# Patient Record
Sex: FEMALE
[De-identification: names, ages, dates, MRNs, and addresses within clinical notes are randomized per-mention and may not be internally consistent; named-entity substitution may affect disease eponyms.]

---

## 2018-03-20 ENCOUNTER — HOSPITAL ENCOUNTER (EMERGENCY)
Dept: HOSPITAL 31 - C.ER | Age: 53
Discharge: HOME | End: 2018-03-20
Payer: COMMERCIAL

## 2018-03-20 VITALS — HEART RATE: 58 BPM | OXYGEN SATURATION: 96 %

## 2018-03-20 VITALS — DIASTOLIC BLOOD PRESSURE: 70 MMHG | SYSTOLIC BLOOD PRESSURE: 130 MMHG | RESPIRATION RATE: 20 BRPM | TEMPERATURE: 98 F

## 2018-03-20 DIAGNOSIS — J32.9: Primary | ICD-10-CM

## 2018-03-20 NOTE — C.PDOC
History Of Present Illness


52 year old female with no significant PMH presents to ED with complaints of 3 

day history of headache and cough with associated chest discomfort. She 

describes pain as pressure like to frontal head area, feels fullness to left 

ear and has yellow nasal mucus. She reports having left eye redness itching, 

tearing and irritation. She has been taking Advil and Claritin with minimal 

relief. She also reports throat discomfort, scratchy sensation which goes into 

her chest and non-productive cough. Denies any fever, dizziness, nausea, or 

vomiting. 


Time Seen by Provider: 18 11:59


Chief Complaint (Nursing): Headache


History Per: Patient


History/Exam Limitations: no limitations


Onset/Duration Of Symptoms: Days (3)


Current Symptoms Are (Timing): Still Present


Quality: Aching, Pressure


Additional History Per: Patient





Past Medical History


Reviewed: Historical Data, Nursing Documentation, Vital Signs


Vital Signs: 


 Last Vital Signs











Temp  98.0 F   18 14:44


 


Pulse  58 L  18 14:44


 


Resp  20   18 14:44


 


BP  130/70   18 14:44


 


Pulse Ox  96   18 16:37














- Medical History


PMH: Bronchitis (4 YEARS AGO)


Surgical History: Cholecystectomy, Endoscopy, Tonsillectomy


Family History: States: Unknown Family Hx





- Social History


Hx Tobacco Use: No


Hx Alcohol Use: No


Hx Substance Use: No





- Immunization History


Hx Tetanus Toxoid Vaccination: No (unsure)


Hx Influenza Vaccination: No


Hx Pneumococcal Vaccination: Yes





Review Of Systems


Constitutional: Negative for: Fever, Chills


Eyes: Positive for: Redness (left eye )


ENT: Positive for: Ear Pain (left ), Other (throat discomfort )


Cardiovascular: Positive for: Other (chest discomfort )


Respiratory: Positive for: Cough.  Negative for: Sputum


Gastrointestinal: Negative for: Nausea, Vomiting


Neurological: Positive for: Headache.  Negative for: Dizziness





Physical Exam





- Physical Exam


Appears: Non-toxic, No Acute Distress


Skin: Normal Color, Warm, Dry


Head: Atraumatic, Tenderness (left maxillary sinus )


Eye(s): bilateral: Normal Inspection, Other (no conjunctival injection, 

discharge, foreign body, or eyelid edema  )


Ear(s): Bilateral: TM Dull


Oral Mucosa: Moist


Neck: Normal ROM, Supple


Chest: Symmetrical, No Deformity, No Tenderness


Cardiovascular: Rhythm Regular, No Murmur


Respiratory: Normal Breath Sounds, No Rales, No Rhonchi, No Wheezing


Extremity: Normal ROM, Capillary Refill (less than 2 seconds )


Neurological/Psych: Oriented x3, Normal Speech, Normal Cognition


Gait: Steady





ED Course And Treatment


ECG: Interpreted By Me, Viewed By Me


ECG Rhythm: Sinus Rhythm


Interpretation Of ECG: EKG NS at 60 bpm with normal axis and sinus arrhythmia


Rate From EC


O2 Sat by Pulse Oximetry: 96 (on RA )


Pulse Ox Interpretation: Normal





- Other Rad


  ** CXR


X-Ray: Interpreted by Me, Viewed By Me, Read By Radiologist


Interpretation: PROCEDURE:  CT HEAD WITHOUT CONTRAST.  HISTORY:  Headache, 

sinus pain, left eye pain.  COMPARISON:  None available.  TECHNIQUE:  Axial 

computed tomography images were obtained through the head/brain without 

intravenous contrast.  Radiation dose:  Total exam DLP = 866.08 mGy-cm.  This 

CT exam was performed using one or more of the following dose reduction 

techniques: Automated exposure control, adjustment of the mA and/or kV 

according to patient size, and/or use of iterative reconstruction technique.  

FINDINGS:  HEMORRHAGE:  No intracranial hemorrhage.  BRAIN:  Gray-white matter 

differentiation is preserved.  There is no mass, mass effect or abnormal extra-

axial fluid collection.  VENTRICLES:  The ventricles are normal in size, shape 

and configuration.  CALVARIUM:  The skull base and calvarium are normal.  

PARANASAL SINUSES:  There is fluid in the left maxillary sinus. The remaining 

included paranasal sinuses are predominantly clear.  MASTOID AIR CELLS:  

Predominantly clear.  OTHER FINDINGS:  None.  IMPRESSION:  No acute 

intracranial abnormality.  Fluid in the left maxillary sinus may represent 

acute sinusitis in the appropriate clinical setting. Clinical correlation and 

follow-up is advised.





- CT Scan/US


  ** CT Head


Other Rad Studies (CT/US): Interpreted By Me, Read By Radiologist, Radiology 

Report Reviewed


CT/US Interpretation: PROCEDURE:  CT HEAD WITHOUT CONTRAST.  HISTORY:  Headache

, sinus pain, left eye pain.  COMPARISON:  None available.  TECHNIQUE:  Axial 

computed tomography images were obtained through the head/brain without 

intravenous contrast.  Radiation dose:  Total exam DLP = 866.08 mGy-cm.  This 

CT exam was performed using one or more of the following dose reduction 

techniques: Automated exposure control, adjustment of the mA and/or kV 

according to patient size, and/or use of iterative reconstruction technique.  

FINDINGS:  HEMORRHAGE:  No intracranial hemorrhage.  BRAIN:  Gray-white matter 

differentiation is preserved.  There is no mass, mass effect or abnormal extra-

axial fluid collection.  VENTRICLES:  The ventricles are normal in size, shape 

and configuration.  CALVARIUM:  The skull base and calvarium are normal.  

PARANASAL SINUSES:  There is fluid in the left maxillary sinus. The remaining 

included paranasal sinuses are predominantly clear.  MASTOID AIR CELLS:  

Predominantly clear.  OTHER FINDINGS:  None.  IMPRESSION:  No acute 

intracranial abnormality.  Fluid in the left maxillary sinus may represent 

acute sinusitis in the appropriate clinical setting. Clinical correlation and 

follow-up is advised.





Medical Decision Making


Medical Decision Making: 





Patient with complaints of headache and multiple other symptoms. Appears well 

nontoxic and in no acute distress. EKG obtained during triage with no acute 

ischemic changse. Plan is to order CT head and CXR. Treat with Sudafed PO. CXR 

shows no active disease. CT reviewed showing Fluid in the left maxillary sinus 

which may represent acute sinusitis. In this clinical setting acute sinusitis 

is appropriate diagnosis and will treat. Patient re-evaluated and remains 

afebrile alert and oriented. I explained results to patient. She feels 

comfortable going home and will be discharged with Rx. Patient advised to 

follow up in the clinic. 





Disposition


Counseled Patient/Family Regarding: Studies Performed, Need For Followup, Rx 

Given





- Disposition


Referrals: 


Saba Sofia MD [Staff Provider] - 


Disposition: HOME/ ROUTINE


Disposition Time: 14:30


Condition: GOOD


Additional Instructions: 


Take antibiotic as indicated for sinus infection





Take daily allergy medicine, can also try nasal spray such as Flonase





Take Sudafed or Mucinex as decongestant 





follow up with the clinic for further evaluation


Prescriptions: 


Doxycycline Hyclate 100 mg PO BID #20 capsule


Instructions:  Sinusitis, Adult (DC)


Forms:  [employer] (Setswana)


Print Language: Urdu





- POA


Present On Arrival: None





- Clinical Impression


Clinical Impression: 


 Sinusitis








- PA / NP / Resident Statement


MD/DO has reviewed & agrees with the documentation as recorded.





- Scribe Statement


The provider has reviewed the documentation as recorded by the Scribe (Delores Cortez)








All medical record entries made by the Scribe were at my direction and 

personally dictated by me. I have reviewed the chart and agree that the record 

accurately reflects my personal performance of the history, physical exam, 

medical decision making, and the department course for this patient. I have 

also personally directed, reviewed, and agree with the discharge instructions 

and disposition.

## 2018-03-20 NOTE — CT
PROCEDURE:  CT HEAD WITHOUT CONTRAST.



HISTORY:

Headache, sinus pain, left eye pain



COMPARISON:

None available. 



TECHNIQUE:

Axial computed tomography images were obtained through the head/brain 

without intravenous contrast.  



Radiation dose:



Total exam DLP = 866.08 mGy-cm.



This CT exam was performed using one or more of the following dose 

reduction techniques: Automated exposure control, adjustment of the 

mA and/or kV according to patient size, and/or use of iterative 

reconstruction technique.



FINDINGS:



HEMORRHAGE:

No intracranial hemorrhage. 



BRAIN:

Gray-white matter differentiation is preserved.  There is no mass, 

mass effect or abnormal extra-axial fluid collection.



VENTRICLES:

The ventricles are normal in size, shape and configuration.



CALVARIUM:

The skull base and calvarium are normal.



PARANASAL SINUSES:

There is fluid in the left maxillary sinus. The remaining included 

paranasal sinuses are predominantly clear.



MASTOID AIR CELLS:

Predominantly clear.



OTHER FINDINGS:

None.



IMPRESSION:

No acute intracranial abnormality.



Fluid in the left maxillary sinus may represent acute sinusitis in 

the appropriate clinical setting. Clinical correlation and follow-up 

is advised.

## 2018-03-21 NOTE — CARD
--------------- APPROVED REPORT --------------





EKG Measurement

Heart [id number]

CT [id number]

[id number]

[id number]

[id number]



<Conclusion>

Normal sinus rhythm with sinus arrhythmia

Normal ECG

## 2018-11-07 ENCOUNTER — HOSPITAL ENCOUNTER (OUTPATIENT)
Dept: HOSPITAL 31 - C.ER | Age: 53
Setting detail: OBSERVATION
LOS: 1 days | Discharge: HOME | End: 2018-11-08
Attending: INTERNAL MEDICINE | Admitting: INTERNAL MEDICINE
Payer: COMMERCIAL

## 2018-11-07 VITALS — RESPIRATION RATE: 20 BRPM

## 2018-11-07 DIAGNOSIS — K21.9: ICD-10-CM

## 2018-11-07 DIAGNOSIS — E78.5: ICD-10-CM

## 2018-11-07 DIAGNOSIS — E78.00: ICD-10-CM

## 2018-11-07 DIAGNOSIS — Z82.49: ICD-10-CM

## 2018-11-07 DIAGNOSIS — E50.7: ICD-10-CM

## 2018-11-07 DIAGNOSIS — E78.1: ICD-10-CM

## 2018-11-07 DIAGNOSIS — Z23: ICD-10-CM

## 2018-11-07 DIAGNOSIS — Z90.49: ICD-10-CM

## 2018-11-07 DIAGNOSIS — R07.9: Primary | ICD-10-CM

## 2018-11-07 DIAGNOSIS — Z79.82: ICD-10-CM

## 2018-11-07 LAB
ALBUMIN SERPL-MCNC: 4.5 G/DL (ref 3.5–5)
ALBUMIN/GLOB SERPL: 1.2 {RATIO} (ref 1–2.1)
ALT SERPL-CCNC: 22 U/L (ref 9–52)
APTT BLD: 34 SECONDS (ref 21–34)
AST SERPL-CCNC: 26 U/L (ref 14–36)
BACTERIA #/AREA URNS HPF: (no result) /[HPF]
BASOPHILS # BLD AUTO: 0.1 K/UL (ref 0–0.2)
BASOPHILS NFR BLD: 0.6 % (ref 0–2)
BILIRUB UR-MCNC: NEGATIVE MG/DL
BNP SERPL-MCNC: 123 PG/ML (ref 0–900)
BUN SERPL-MCNC: 10 MG/DL (ref 7–17)
CALCIUM SERPL-MCNC: 9.8 MG/DL (ref 8.6–10.4)
EOSINOPHIL # BLD AUTO: 0.3 K/UL (ref 0–0.7)
EOSINOPHIL NFR BLD: 3.1 % (ref 0–4)
ERYTHROCYTE [DISTWIDTH] IN BLOOD BY AUTOMATED COUNT: 13.6 % (ref 11.5–14.5)
GFR NON-AFRICAN AMERICAN: > 60
GLUCOSE UR STRIP-MCNC: NORMAL MG/DL
HCG,QUALITATIVE URINE: NEGATIVE
HGB BLD-MCNC: 13.8 G/DL (ref 11–16)
INR PPP: 1
LEUKOCYTE ESTERASE UR-ACNC: (no result) LEU/UL
LYMPHOCYTES # BLD AUTO: 4.2 K/UL (ref 1–4.3)
LYMPHOCYTES NFR BLD AUTO: 40.8 % (ref 20–40)
MCH RBC QN AUTO: 28.2 PG (ref 27–31)
MCHC RBC AUTO-ENTMCNC: 34 G/DL (ref 33–37)
MCV RBC AUTO: 83 FL (ref 81–99)
MONOCYTES # BLD: 0.6 K/UL (ref 0–0.8)
MONOCYTES NFR BLD: 5.8 % (ref 0–10)
NEUTROPHILS # BLD: 5.1 K/UL (ref 1.8–7)
NEUTROPHILS NFR BLD AUTO: 49.7 % (ref 50–75)
NRBC BLD AUTO-RTO: 0 % (ref 0–2)
PH UR STRIP: 6 [PH] (ref 5–8)
PLATELET # BLD: 409 K/UL (ref 130–400)
PMV BLD AUTO: 8 FL (ref 7.2–11.7)
PROT UR STRIP-MCNC: NEGATIVE MG/DL
PROTHROMBIN TIME: 11.4 SECONDS (ref 9.7–12.2)
RBC # BLD AUTO: 4.91 MIL/UL (ref 3.8–5.2)
RBC # UR STRIP: NEGATIVE /UL
SP GR UR STRIP: 1 (ref 1–1.03)
UROBILINOGEN UR-MCNC: NORMAL MG/DL (ref 0.2–1)
WBC # BLD AUTO: 10.2 K/UL (ref 4.8–10.8)

## 2018-11-07 PROCEDURE — 81001 URINALYSIS AUTO W/SCOPE: CPT

## 2018-11-07 PROCEDURE — 90674 CCIIV4 VAC NO PRSV 0.5 ML IM: CPT

## 2018-11-07 PROCEDURE — 90732 PPSV23 VACC 2 YRS+ SUBQ/IM: CPT

## 2018-11-07 PROCEDURE — 86663 EPSTEIN-BARR ANTIBODY: CPT

## 2018-11-07 PROCEDURE — 86658 ENTEROVIRUS ANTIBODY: CPT

## 2018-11-07 PROCEDURE — 36415 COLL VENOUS BLD VENIPUNCTURE: CPT

## 2018-11-07 PROCEDURE — 71045 X-RAY EXAM CHEST 1 VIEW: CPT

## 2018-11-07 PROCEDURE — 80061 LIPID PANEL: CPT

## 2018-11-07 PROCEDURE — 93306 TTE W/DOPPLER COMPLETE: CPT

## 2018-11-07 PROCEDURE — 84703 CHORIONIC GONADOTROPIN ASSAY: CPT

## 2018-11-07 PROCEDURE — 85610 PROTHROMBIN TIME: CPT

## 2018-11-07 PROCEDURE — 86140 C-REACTIVE PROTEIN: CPT

## 2018-11-07 PROCEDURE — 84443 ASSAY THYROID STIM HORMONE: CPT

## 2018-11-07 PROCEDURE — 83036 HEMOGLOBIN GLYCOSYLATED A1C: CPT

## 2018-11-07 PROCEDURE — 87807 RSV ASSAY W/OPTIC: CPT

## 2018-11-07 PROCEDURE — 83735 ASSAY OF MAGNESIUM: CPT

## 2018-11-07 PROCEDURE — 90471 IMMUNIZATION ADMIN: CPT

## 2018-11-07 PROCEDURE — 85730 THROMBOPLASTIN TIME PARTIAL: CPT

## 2018-11-07 PROCEDURE — 93005 ELECTROCARDIOGRAM TRACING: CPT

## 2018-11-07 PROCEDURE — 84100 ASSAY OF PHOSPHORUS: CPT

## 2018-11-07 PROCEDURE — 84484 ASSAY OF TROPONIN QUANT: CPT

## 2018-11-07 PROCEDURE — 99285 EMERGENCY DEPT VISIT HI MDM: CPT

## 2018-11-07 PROCEDURE — 83880 ASSAY OF NATRIURETIC PEPTIDE: CPT

## 2018-11-07 PROCEDURE — 85025 COMPLETE CBC W/AUTO DIFF WBC: CPT

## 2018-11-07 PROCEDURE — 85651 RBC SED RATE NONAUTOMATED: CPT

## 2018-11-07 PROCEDURE — 80053 COMPREHEN METABOLIC PANEL: CPT

## 2018-11-07 PROCEDURE — 96372 THER/PROPH/DIAG INJ SC/IM: CPT

## 2018-11-07 NOTE — RAD
Date of service: 



11/07/2018



PROCEDURE:  CHEST RADIOGRAPH, 1 VIEW



HISTORY:

chest pain



COMPARISON:

3/20/2018



FINDINGS:



LUNGS:

Clear.



PLEURA:

No pneumothorax or pleural fluid seen.



CARDIOVASCULAR:

Normal.



OSSEOUS STRUCTURES:

No significant abnormalities.



VISUALIZED UPPER ABDOMEN:

Normal.



OTHER FINDINGS:

None. 



IMPRESSION:

No active disease.

## 2018-11-07 NOTE — CP.PCM.HP
<Mena Booker P - Last Filed: 11/08/18 10:55>





History of Present Illness





- History of Present Illness


History of Present Illness: 


H&P for hospitalist service.





HPI: 52 year old female with PMHx of impaired glucose tolerance, and 

hypercholesterolemia presents to the ED complaining of 7/10 left sided chest 

pain for the past 3-4 days. Pain is described as pressure and worsens with 

bending over and with exertion. States this has never happened before. Patient 

also complains of shortness of breath, diaphoresis, neck pain, frontal headache,

dizziness, slow heart rate, sensation of inflammation to the ears and buzzing in

the ears. Patient denies fever, chills, nausea, vomiting, abdominal pain, and 

cough.





PMHx: impaired glucose tolerance, hypercholesterolemia, retinal degeneration, 

dry eyes


PSHx: cholecystectomy, tonsillectomy, hemmorroid, b/l foot surgery


Meds: Zocor 20mg HS, Refresh eye drops


Allergies: PCN, unknown reaction


Family: Mother-MI at 68 years old, Father-HTN, glaucoma


Social: denies tobacco, alcohol, drugs. Works as a . Lives with her 

12 year old son.


Code status: full


Proxy: Flori Singh, friend, [phone]








Present on Admission





- Present on Admission


Any Indicators Present on Admission: No





Review of Systems





- Constitutional


Constitutional: Headache.  absent: Chills, Fever





- EENT


Eyes: absent: Discharge, Pain


Ears: Dizziness, Other (wax)


Nose/Mouth/Throat: Neck Pain.  absent: Dysphagia, Sore Throat





- Cardiovascular


Cardiovascular: Chest Pain, Diaphoresis, Slow Heart Rate.  absent: Edema, 

Palpitations





- Respiratory


Respiratory: Dyspnea.  absent: Cough, Hemoptysis





- Gastrointestinal


Gastrointestinal: absent: Abdominal Pain, Constipation, Diarrhea, Dysphagia, 

Heartburn, Nausea, Vomiting





- Genitourinary


Genitourinary: absent: Difficulty Urinating, Dysuria, Flank Pain, Hematuria





- Musculoskeletal


Musculoskeletal: Neck Pain, Other (chest pain).  absent: Joint Swelling





- Integumentary


Integumentary: absent: Rash, Swelling, Unusual Bruising, Wounds





- Neurological


Neurological: Dizziness, Headaches.  absent: Burning Sensations, Convulsions, 

Disequilibrium, Focal Weakness, Paresthesias





Past Patient History





- Infectious Disease


Hx of Infectious Diseases: None





- Past Medical History & Family History


Past Medical History?: Yes





- Past Social History


Smoking Status: Never Smoked





- PULMONARY


Hx Bronchitis: Yes (4 YEARS AGO)





- ENDOCRINE/METABOLIC


Other/Comment: "pre diabetic"





- MUSCULOSKELETAL/RHEUMATOLOGICAL


Hx Musculoskeletal Disorders: Yes (BUNION/HAMMERTOES RIGHT FOOT)





- GASTROINTESTINAL


Hx Gastrointestinal Disorders: Yes


Hx Gastroesophageal Reflux: Yes





- PSYCHIATRIC


Hx Substance Use: No





- SURGICAL HISTORY


Hx Cholecystectomy: Yes


Hx Tonsillectomy: Yes





- ANESTHESIA


Hx Anesthesia: Yes


Hx Anesthesia Reactions: Yes (NAUSEA)


Hx Malignant Hyperthermia: No





Meds


Allergies/Adverse Reactions: 


                                    Allergies











Allergy/AdvReac Type Severity Reaction Status Date / Time


 


Penicillins Allergy Intermediate RASH Verified 03/20/18 11:24














Physical Exam





- Constitutional


Appears: Non-toxic, No Acute Distress





- Head Exam


Head Exam: ATRAUMATIC, NORMOCEPHALIC





- Eye Exam


Eye Exam: EOMI, Normal appearance, PERRL





- ENT Exam


ENT Exam: Mucous Membranes Moist, TM's Normal Bilaterally





- Neck Exam


Neck exam: Positive for: Full Rom, Normal Inspection, Tenderness (to palpation 

of paracervical muscles)





- Respiratory Exam


Respiratory Exam: Clear to Auscultation Bilateral.  absent: Rales, Rhonchi, 

Wheezes





- Cardiovascular Exam


Cardiovascular Exam: Bradycardia, +S1, +S2.  absent: Systolic Murmur


Additional comments: 


Reproducible chest pain with palpation. 








- GI/Abdominal Exam


GI & Abdominal Exam: Guarding, Normal Bowel Sounds, Soft.  absent: Rebound, 

Rigid, Tenderness





- Extremities Exam


Extremities exam: Positive for: full ROM, normal capillary refill, normal 

inspection, pedal pulses present.  Negative for: calf tenderness, pedal edema, 

tenderness





- Neurological Exam


Neurological exam: Alert, CN II-XII Intact, Oriented x3





- Psychiatric Exam


Psychiatric exam: Normal Affect, Normal Mood





- Skin


Skin Exam: Dry, Intact, Normal Color, Warm





Results





- Vital Signs


Recent Vital Signs: 





                                Last Vital Signs











Temp  98.1 F   11/07/18 20:20


 


Pulse  62   11/07/18 21:37


 


Resp  20   11/07/18 20:20


 


BP  135/80   11/07/18 20:20


 


Pulse Ox  97   11/07/18 20:20














- Labs


Result Diagrams: 


                                 11/08/18 07:39





                                 11/08/18 07:39


Labs: 





                         Laboratory Results - last 24 hr











  11/07/18 11/07/18 11/07/18





  18:23 18:23 18:23


 


WBC  10.2  


 


RBC  4.91  


 


Hgb  13.8  


 


Hct  40.7  


 


MCV  83.0  


 


MCH  28.2  


 


MCHC  34.0  


 


RDW  13.6  


 


Plt Count  409 H  


 


MPV  8.0  


 


Neut % (Auto)  49.7 L  


 


Lymph % (Auto)  40.8 H  


 


Mono % (Auto)  5.8  


 


Eos % (Auto)  3.1  


 


Baso % (Auto)  0.6  


 


Neut # (Auto)  5.1  


 


Lymph # (Auto)  4.2  


 


Mono # (Auto)  0.6  


 


Eos # (Auto)  0.3  


 


Baso # (Auto)  0.1  


 


PT   11.4 


 


INR   1.0 


 


APTT   34 


 


Sodium    141


 


Potassium    3.7


 


Chloride    105


 


Carbon Dioxide    27


 


Anion Gap    13


 


BUN    10


 


Creatinine    0.8


 


Est GFR ( Amer)    > 60


 


Est GFR (Non-Af Amer)    > 60


 


Random Glucose    98


 


Calcium    9.8


 


Total Bilirubin    0.6


 


AST    26


 


ALT    22


 


Alkaline Phosphatase    103


 


Troponin I    < 0.0120


 


NT-Pro-B Natriuret Pep    123


 


Total Protein    8.2


 


Albumin    4.5


 


Globulin    3.7


 


Albumin/Globulin Ratio    1.2


 


Urine Color   


 


Urine Clarity   


 


Urine pH   


 


Ur Specific Gravity   


 


Urine Protein   


 


Urine Glucose (UA)   


 


Urine Ketones   


 


Urine Blood   


 


Urine Nitrate   


 


Urine Bilirubin   


 


Urine Urobilinogen   


 


Ur Leukocyte Esterase   


 


Urine WBC (Auto)   


 


Urine Bacteria   


 


Urine HCG, Qual   














  11/07/18





  18:43


 


WBC 


 


RBC 


 


Hgb 


 


Hct 


 


MCV 


 


MCH 


 


MCHC 


 


RDW 


 


Plt Count 


 


MPV 


 


Neut % (Auto) 


 


Lymph % (Auto) 


 


Mono % (Auto) 


 


Eos % (Auto) 


 


Baso % (Auto) 


 


Neut # (Auto) 


 


Lymph # (Auto) 


 


Mono # (Auto) 


 


Eos # (Auto) 


 


Baso # (Auto) 


 


PT 


 


INR 


 


APTT 


 


Sodium 


 


Potassium 


 


Chloride 


 


Carbon Dioxide 


 


Anion Gap 


 


BUN 


 


Creatinine 


 


Est GFR ( Amer) 


 


Est GFR (Non-Af Amer) 


 


Random Glucose 


 


Calcium 


 


Total Bilirubin 


 


AST 


 


ALT 


 


Alkaline Phosphatase 


 


Troponin I 


 


NT-Pro-B Natriuret Pep 


 


Total Protein 


 


Albumin 


 


Globulin 


 


Albumin/Globulin Ratio 


 


Urine Color  Straw


 


Urine Clarity  Clear


 


Urine pH  6.0


 


Ur Specific Gravity  1.005


 


Urine Protein  Negative


 


Urine Glucose (UA)  Normal


 


Urine Ketones  Negative


 


Urine Blood  Negative


 


Urine Nitrate  Negative


 


Urine Bilirubin  Negative


 


Urine Urobilinogen  Normal


 


Ur Leukocyte Esterase  Trace


 


Urine WBC (Auto)  2


 


Urine Bacteria  Rare


 


Urine HCG, Qual  Negative














Assessment & Plan





- Assessment and Plan (Free Text)


Plan: 


52 year old female with PMHx of impaired glucose tolerance, HLD admitted for 

evaluation of chest pain. 





Chest pain rule out ACS


Possible symptomatic bradycardia


-EKG: Sinus bradycardia at 56bpm, f/u serial EKGs


-Troponin negx1, f/u serial ROMIs


-f/u lipid panel


-f/u hgb A1c


-f/u TSH


-Start Aspirin 81mg daily


-f/u echo


-Cardiology, Dr. SHIMON Cortez consulted. Help appreciated. 





Hyperlipidemia


-f/u lipid panel


-Crestor 2.5mg PO HS (substitute for home med of Zocor 20mg HS)





Impaired glucose tolerance


-f/u hgb A1c





Xerophthalmia


-Refresh eye drops (home med)





Prophylaxis


-SCD


-Heparin 5000 Q8H


-GI prophylaxis not indicated





Case discussed with attending physician, Dr. Yusuf. 








<Jesus Yusuf - Last Filed: 11/10/18 19:05>





Results





- Vital Signs


Recent Vital Signs: 





                                Last Vital Signs











Temp  98.6 F   11/08/18 15:09


 


Pulse  50 L  11/08/18 15:09


 


Resp  20   11/08/18 15:09


 


BP  102/63   11/08/18 15:09


 


Pulse Ox  96   11/08/18 15:09














- Labs


Result Diagrams: 


                                 11/08/18 07:39





                                 11/08/18 07:39





Assessment & Plan





- Date & Time


Date: 11/10/18 (I have seen and examined the patient.  I agree with the findings

and plan of care as documented by Dr. Booker.  Patient with chest pain.  

Bradycardia.  Monitor on tele.  Aspirin and Statin.  ROMIx3 with EKG.  Monitor 

for acute changes.)


Time: 19:04





Attending/Attestation





- Attestation


I have personally seen and examined this patient.: Yes


I have fully participated in the care of the patient.: Yes


I have reviewed all pertinent clinical information: Yes

## 2018-11-07 NOTE — C.PDOC
History Of Present Illness





52 year old female with a hx of hyperlipidemia and pre-diabetic presents to the 

ED for evaluation of chest pain and dizziness associated with light headedness 

that began 1 day ago. Denies fever, nausea, vomiting, syncope, and any other 

associated symptoms. 





Time Seen by Provider: 18 16:33


Chief Complaint (Nursing): Chest Pain


History Per: Patient


History/Exam Limitations: no limitations


Onset/Duration Of Symptoms: Days (1 day ago.)


Current Symptoms Are (Timing): Still Present





Past Medical History


Reviewed: Historical Data, Nursing Documentation, Vital Signs


Vital Signs: 





                                Last Vital Signs











Temp  97.7 F   18 15:56


 


Pulse  59 L  18 15:56


 


Resp  18   18 15:56


 


BP  133/68   18 15:56


 


Pulse Ox  100   18 15:56














- Medical History


PMH: Bronchitis (4 YEARS AGO)


Surgical History: Cholecystectomy, Endoscopy, Tonsillectomy


Family History: States: Unknown Family Hx





- Social History


Hx Tobacco Use: No


Hx Alcohol Use: No


Hx Substance Use: No





- Immunization History


Hx Tetanus Toxoid Vaccination: No (unsure)


Hx Influenza Vaccination: No


Hx Pneumococcal Vaccination: Yes





Review Of Systems


Constitutional: Negative for: Fever


Cardiovascular: Positive for: Chest Pain, Light Headedness


Gastrointestinal: Negative for: Nausea, Vomiting


Neurological: Positive for: Dizziness, Other (syncope.)





Physical Exam





- Physical Exam


Appears: Well, Non-toxic, No Acute Distress, Other (moderately obese.)


Skin: Normal Color, Warm, Dry


Head: Atraumatic, Normacephalic


Eye(s): bilateral: Normal Inspection


Oral Mucosa: Moist


Neck: Normal ROM


Chest: Symmetrical, No Deformity


Cardiovascular: Rhythm Regular, No Murmur


Respiratory: Normal Breath Sounds, No Rales, No Rhonchi, No Wheezing


Gastrointestinal/Abdominal: Normal Exam, Soft, No Tenderness


Neurological/Psych: Oriented x3, Normal Speech





ED Course And Treatment





- Laboratory Results


Result Diagrams: 


                                 18 07:39





                                 18 07:39


ECG Rhythm: Sinus Bradycardia


Rate From EC


O2 Sat by Pulse Oximetry: 100 (RA)


Pulse Ox Interpretation: Normal





- Other Rad


  ** CXR


X-Ray: Viewed By Me, Read By Radiologist


Interpretation: FINDINGS:  LUNGS:  Clear.  PLEURA:  No pneumothorax or pleural 

fluid seen.  CARDIOVASCULAR:  Normal.  OSSEOUS STRUCTURES:  No significant abno

rmalities.  VISUALIZED UPPER ABDOMEN:  Normal.  OTHER FINDINGS:  None.  

IMPRESSION:  No active disease.





Medical Decision Making


Medical Decision Making: 





Plan: 


-EKG 


-Blood sent. 


-CXR 


-Urine HCG 


-Urinalysis








labs cxr neg. pt with risk factors obs for cp. accepted hospitalist. 





Disposition





- Disposition


Disposition: HOSPITALIZED


Disposition Time: 17:00


Condition: STABLE





- Clinical Impression


Clinical Impression: 


 Chest pain








- Scribe Statement


The provider has reviewed the documentation as recorded by the Scribe (Yesenia Jonas)


Provider Attestation: 





All medical record entries made by the Scribe were at my direction and 

personally dictated by me. I have reviewed the chart and agree that the record 

accurately reflects my personal performance of the history, physical exam, 

medical decision making, and the department course for this patient. I have also

personally directed, reviewed, and agree with the discharge instructions and 

disposition.








Decision To Admit





- Pt Status Changed To:


Hospital Disposition Of: Observation





- .


Bed Request Type: Telemetry


Admitting Physician: Lenard Ventura


Patient Diagnosis: 


 Chest pain

## 2018-11-08 VITALS
TEMPERATURE: 98.6 F | SYSTOLIC BLOOD PRESSURE: 102 MMHG | OXYGEN SATURATION: 96 % | HEART RATE: 50 BPM | DIASTOLIC BLOOD PRESSURE: 63 MMHG

## 2018-11-08 LAB
ALBUMIN SERPL-MCNC: 4 G/DL (ref 3.5–5)
ALBUMIN/GLOB SERPL: 1.3 {RATIO} (ref 1–2.1)
ALT SERPL-CCNC: 21 U/L (ref 9–52)
AST SERPL-CCNC: 26 U/L (ref 14–36)
BASOPHILS # BLD AUTO: 0 K/UL (ref 0–0.2)
BASOPHILS NFR BLD: 0.6 % (ref 0–2)
BUN SERPL-MCNC: 10 MG/DL (ref 7–17)
CALCIUM SERPL-MCNC: 9.5 MG/DL (ref 8.6–10.4)
CK MB SERPL-MCNC: < 0.22 NG/ML (ref 0–3.38)
CK MB SERPL-MCNC: < 0.22 NG/ML (ref 0–3.38)
EOSINOPHIL # BLD AUTO: 0.3 K/UL (ref 0–0.7)
EOSINOPHIL NFR BLD: 4.2 % (ref 0–4)
ERYTHROCYTE [DISTWIDTH] IN BLOOD BY AUTOMATED COUNT: 13.6 % (ref 11.5–14.5)
ERYTHROCYTE [SEDIMENTATION RATE] IN BLOOD: 70 MM/HR (ref 0–20)
GFR NON-AFRICAN AMERICAN: > 60
HDLC SERPL-MCNC: 37 MG/DL (ref 30–70)
HGB BLD-MCNC: 12.9 G/DL (ref 11–16)
LDLC SERPL-MCNC: 133 MG/DL (ref 0–129)
LYMPHOCYTES # BLD AUTO: 3.4 K/UL (ref 1–4.3)
LYMPHOCYTES NFR BLD AUTO: 46.2 % (ref 20–40)
MCH RBC QN AUTO: 28.7 PG (ref 27–31)
MCHC RBC AUTO-ENTMCNC: 34.6 G/DL (ref 33–37)
MCV RBC AUTO: 82.9 FL (ref 81–99)
MONOCYTES # BLD: 0.4 K/UL (ref 0–0.8)
MONOCYTES NFR BLD: 5.9 % (ref 0–10)
NEUTROPHILS # BLD: 3.2 K/UL (ref 1.8–7)
NEUTROPHILS NFR BLD AUTO: 43.1 % (ref 50–75)
NRBC BLD AUTO-RTO: 0.1 % (ref 0–2)
PLATELET # BLD: 378 K/UL (ref 130–400)
PMV BLD AUTO: 8.4 FL (ref 7.2–11.7)
RBC # BLD AUTO: 4.48 MIL/UL (ref 3.8–5.2)
WBC # BLD AUTO: 7.4 K/UL (ref 4.8–10.8)

## 2018-11-08 NOTE — CARD
--------------- APPROVED REPORT --------------





Date of service: 11/07/2018



EKG Measurement

Heart [id number]

IL 146P12

[id number]

[id number]

[id number]



<Conclusion>

Sinus bradycardia

Otherwise normal ECG

## 2018-11-08 NOTE — CP.PCM.DIS
Provider





- Provider


Date of Admission: 


11/07/18 18:57





Attending physician: 


Lenard Ventura MD





Primary care physician: 


Dr. Sofia


Consults: 


Dr. Sanford


Time Spent in preparation of Discharge (in minutes): 35





Hospital Course





- Lab Results


Lab Results: 


                             Most Recent Lab Values











WBC  7.4 K/uL (4.8-10.8)   11/08/18  07:39    


 


RBC  4.48 Mil/uL (3.80-5.20)   11/08/18  07:39    


 


Hgb  12.9 g/dL (11.0-16.0)   11/08/18  07:39    


 


Hct  37.1 % (34.0-47.0)   11/08/18  07:39    


 


MCV  82.9 fL (81.0-99.0)   11/08/18  07:39    


 


MCH  28.7 pg (27.0-31.0)   11/08/18  07:39    


 


MCHC  34.6 g/dL (33.0-37.0)   11/08/18  07:39    


 


RDW  13.6 % (11.5-14.5)   11/08/18  07:39    


 


Plt Count  378 K/uL (130-400)   11/08/18  07:39    


 


MPV  8.4 fL (7.2-11.7)   11/08/18  07:39    


 


Neut % (Auto)  43.1 % (50.0-75.0)  L  11/08/18  07:39    


 


Lymph % (Auto)  46.2 % (20.0-40.0)  H  11/08/18  07:39    


 


Mono % (Auto)  5.9 % (0.0-10.0)   11/08/18  07:39    


 


Eos % (Auto)  4.2 % (0.0-4.0)  H  11/08/18  07:39    


 


Baso % (Auto)  0.6 % (0.0-2.0)   11/08/18  07:39    


 


Neut # (Auto)  3.2 K/uL (1.8-7.0)   11/08/18  07:39    


 


Lymph # (Auto)  3.4 K/uL (1.0-4.3)   11/08/18  07:39    


 


Mono # (Auto)  0.4 K/uL (0.0-0.8)   11/08/18  07:39    


 


Eos # (Auto)  0.3 K/uL (0.0-0.7)   11/08/18  07:39    


 


Baso # (Auto)  0.0 K/uL (0.0-0.2)   11/08/18  07:39    


 


ESR  70 mm/hr (0-20)  H  11/08/18  07:39    


 


PT  11.4 SECONDS (9.7-12.2)   11/07/18  18:23    


 


INR  1.0   11/07/18  18:23    


 


APTT  34 SECONDS (21-34)   11/07/18  18:23    


 


Sodium  140 mmol/L (132-148)   11/08/18  07:39    


 


Potassium  4.1 mmol/L (3.6-5.2)   11/08/18  07:39    


 


Chloride  107 mmol/L ()   11/08/18  07:39    


 


Carbon Dioxide  26 mmol/L (22-30)   11/08/18  07:39    


 


Anion Gap  11  (10-20)   11/08/18  07:39    


 


BUN  10 mg/dL (7-17)   11/08/18  07:39    


 


Creatinine  0.8 mg/dL (0.7-1.2)   11/08/18  07:39    


 


Est GFR ( Amer)  > 60   11/08/18  07:39    


 


Est GFR (Non-Af Amer)  > 60   11/08/18  07:39    


 


Random Glucose  97 mg/dL ()   11/08/18  07:39    


 


Hemoglobin A1c  5.8 % (4.2-6.5)   11/08/18  07:39    


 


Calcium  9.5 mg/dl (8.6-10.4)   11/08/18  07:39    


 


Phosphorus  3.9 mg/dL (2.5-4.5)   11/08/18  07:39    


 


Magnesium  2.3 mg/dL (1.6-2.3)   11/08/18  07:39    


 


Total Bilirubin  0.9 mg/dL (0.2-1.3)   11/08/18  07:39    


 


AST  26 U/L (14-36)   11/08/18  07:39    


 


ALT  21 U/L (9-52)   11/08/18  07:39    


 


Alkaline Phosphatase  114 U/L ()   11/08/18  07:39    


 


Total Creatine Kinase  < 20 U/L ()  L  11/08/18  07:39    


 


CK-MB (Mass)  < 0.22 ng/mL (0.0-3.38)   11/08/18  07:39    


 


Troponin I  < 0.0120 ng/mL (0.00-0.120)   11/08/18  07:39    


 


C-React Prot High Sens  4.58 mg/L (1.00-3.00)  H  11/08/18  13:57    


 


NT-Pro-B Natriuret Pep  123 pg/mL (0-900)   11/07/18  18:23    


 


Total Protein  7.2 g/dL (6.3-8.3)   11/08/18  07:39    


 


Albumin  4.0 g/dL (3.5-5.0)   11/08/18  07:39    


 


Globulin  3.1 gm/dL (2.2-3.9)   11/08/18  07:39    


 


Albumin/Globulin Ratio  1.3  (1.0-2.1)   11/08/18  07:39    


 


Triglycerides  209 mg/dL (0-149)  H  11/08/18  07:39    


 


Cholesterol  209 mg/dL (0-199)  H  11/08/18  07:39    


 


LDL Cholesterol Direct  133 mg/dL (0-129)  H  11/08/18  07:39    


 


HDL Cholesterol  37 mg/dL (30-70)   11/08/18  07:39    


 


TSH 3rd Generation  3.93 mIU/L (0.46-4.68)   11/08/18  07:39    


 


Urine Color  Straw  (YELLOW)   11/07/18  18:43    


 


Urine Clarity  Clear  (Clear)   11/07/18  18:43    


 


Urine pH  6.0  (5.0-8.0)   11/07/18  18:43    


 


Ur Specific Gravity  1.005  (1.003-1.030)   11/07/18  18:43    


 


Urine Protein  Negative mg/dL (NEGATIVE)   11/07/18  18:43    


 


Urine Glucose (UA)  Normal mg/dL (Normal)   11/07/18  18:43    


 


Urine Ketones  Negative mg/dL (NEGATIVE)   11/07/18  18:43    


 


Urine Blood  Negative  (NEGATIVE)   11/07/18  18:43    


 


Urine Nitrate  Negative  (NEGATIVE)   11/07/18  18:43    


 


Urine Bilirubin  Negative  (NEGATIVE)   11/07/18  18:43    


 


Urine Urobilinogen  Normal mg/dL (0.2-1.0)   11/07/18  18:43    


 


Ur Leukocyte Esterase  Trace Luisa/uL (Negative)   11/07/18  18:43    


 


Urine WBC (Auto)  2 /hpf (0-5)   11/07/18  18:43    


 


Urine Bacteria  Rare  (<OCC)   11/07/18  18:43    


 


Urine HCG, Qual  Negative  (NEGATIVE)   11/07/18  18:43    














- Hospital Course


Hospital Course: 





On admission: 


HPI: 52 year old female with PMHx of impaired glucose tolerance, and 

hypercholesterolemia presents to the ED complaining of 7/10 left sided chest 

pain for the past 3-4 days. Pain is described as pressure and worsens with 

bending over and with exertion. States this has never happened before. Patient 

also complains of shortness of breath, diaphoresis, neck pain, frontal headache,

dizziness, slow heart rate, sensation of inflammation to the ears and buzzing in

the ears. Patient denies fever, chills, nausea, vomiting, abdominal pain, and 

cough.





Hospital course: 


Patient was admitted for chest pain to evaluate for acute coronary syndrome. 

Cardiology Dr. Sanford was consulted who evaluated patient. Serial troponins were 

negative. Serial EKGs revealed sinus bradycardia. Preliminary ECHO read was 

normal  with no obvious deformities. Lipid panel revealed elevated triglycerides

at 209, elevated cholesterol at 209, LDL at 133, HDL 37, TSH 3.93. ProBNP was 

normal at 123. AST/ALT levels were within normal limits. Inflammatory markers 

ESR noted to be 70, CRP 4.58. Patient was treated with ASA and Crestor. Patient 

stated that her chest pain had resolved, and was only present on palpation. She 

admits to history of vertigo in the past for which she was treated with unknown 

medication. She states that her dizziness and buzzing in her ears is worse when 

she bends over. Upon discharge, patient states she no longer has any chest pain 

and feels comfortable going home. 





Discharge summary: 


Follow up with [facility] clinic in 2-3 weeks for further follow up. 

You have elevated levels of ESR and CRP. Please follow up in the clinic for 

referral to Rheumatology. Continue your home medications Zocor 20mg by mouth at 

night for your elevated cholesterol levels. Please return to the ED if you have 

chest pain, shortness of breath, palpitations. 





Script: 


Zithromax 250mg by mouth for 5 days. Disp # 5 (five) 





Discharge Exam





- Head Exam


Head Exam: ATRAUMATIC, NORMOCEPHALIC





- Eye Exam


Eye Exam: EOMI





- ENT Exam


ENT Exam: Mucous Membranes Moist





- Neck Exam


Neck exam: Full Rom





- Respiratory Exam


Respiratory Exam: Clear to PA & Lateral.  absent: Rales, Rhonchi, Wheezes, 

Respiratory Distress, Stridor





- Cardiovascular Exam


Cardiovascular Exam: Bradycardia, +S1, +S2.  absent: Gallop, JVD, Rubs, Systolic

Murmur


Additional comments: 





tender to palpation along anterior chest 





- GI/Abdominal Exam


GI & Abdominal Exam: Normal Bowel Sounds, Soft.  absent: Distended, Firm, 

Guarding, Hernia, Tenderness





- Extremities Exam


Extremities exam: normal capillary refill, pedal pulses present


Additional comments: 





no calf tenderness, no pedal edema. 





- Back Exam


Back exam: absent: CVA tenderness (L), CVA tenderness (R)





- Neurological Exam


Neurological exam: Alert, Oriented x3





- Skin


Skin Exam: Dry, Intact, Warm





Discharge Plan





- Discharge Medications


Prescriptions: 


Azithromycin [Zithromax] 250 mg PO DAILY #5 tab





- Follow Up Plan


Condition: STABLE


Disposition: HOME/ ROUTINE


Instructions:  Heart Healthy Diet, Chest Pain (DC), Costochondritis (DC)


Additional Instructions: 


Follow up with [facility] clinic in 2-3 weeks for further follow up. 

You have elevated levels of ESR and CRP. Please follow up in the clinic for 

referral to Rheumatology. Continue your home medications Zocor 20mg by mouth at 

night for your elevated cholesterol levels. Please return to the ED if you have 

chest pain, shortness of breath, palpitations. 





Script: 


Zithromax 250mg by mouth for 5 days. Disp # 5 (five) 


Referrals: 


Fer Sanford MD [Staff Provider] -

## 2018-11-08 NOTE — CP.PCM.PN
Subjective





- Date & Time of Evaluation


Date of Evaluation: 11/08/18


Time of Evaluation: 07:39





- Subjective


Subjective: 





Progress note for Hospitalist service 





Patient seen and examined at bedside. She states she is does not have any chest 

pain anymore, however states she feels chest pain if she presses her chest. She 

admits she has a constant buzzing feeling in her left ear associated with 

dizziness that is worse when she bends over for the past 3 to 4 days. She admits

to mild shortness of breath along with  mild neck pain. She states she was told 

she had low pulse when she had surgery about 2 years ago. 





Objective





- Vital Signs/Intake and Output


Vital Signs (last 24 hours): 


                                        











Temp Pulse Resp BP Pulse Ox


 


 98.3 F   55 L  20   111/68   96 


 


 11/07/18 23:10  11/08/18 01:00  11/07/18 23:10  11/07/18 23:10  11/07/18 23:10











- Medications


Medications: 


                               Current Medications





Artificial Tears (Refresh Opth Soln)  0.1 ml OU DAILY [facility]


Aspirin (Aspirin Chewable)  81 mg PO DAILY [facility]


Heparin Sodium (Porcine) (Heparin)  5,000 units SC Q8 [facility]


   Last Admin: 11/08/18 05:37 Dose:  5,000 units


Rosuvastatin Calcium (Crestor)  2.5 mg PO HS [facility]


   Last Admin: 11/07/18 21:17 Dose:  2.5 mg











- Labs


Labs: 


                                        





                                 11/07/18 18:23 





                                 11/07/18 18:23 





                                        











PT  11.4 SECONDS (9.7-12.2)   11/07/18  18:23    


 


INR  1.0   11/07/18  18:23    


 


APTT  34 SECONDS (21-34)   11/07/18  18:23    














- Constitutional


Appears: Well, No Acute Distress





- Head Exam


Head Exam: ATRAUMATIC, NORMOCEPHALIC





- Eye Exam


Eye Exam: EOMI





- ENT Exam


ENT Exam: Mucous Membranes Moist





- Respiratory Exam


Respiratory Exam: Clear to Ausculation Bilateral.  absent: Decreased Breath 

Sounds, Rales, Rhonchi, Wheezes, Respiratory Distress, Stridor





- Cardiovascular Exam


Cardiovascular Exam: Bradycardia, +S1, +S2





- GI/Abdominal Exam


GI & Abdominal Exam: Soft, Normal Bowel Sounds.  absent: Guarding, Rigid, 

Tenderness





- Extremities Exam


Extremities Exam: Normal Capillary Refill.  absent: Calf Tenderness, Pedal Edema





- Back Exam


Back Exam: absent: CVA tenderness (L), CVA tenderness (R)





- Neurological Exam


Neurological Exam: Alert, Awake, Oriented x3





Assessment and Plan





- Assessment and Plan (Free Text)


Plan: 





Assessment


52 year old female with PMHx of impaired glucose tolerance, HLD admitted for 

evaluation of chest pain. 





Plan 


Chest pain rule out ACS


Possible symptomatic bradycardia 


EKG sinus bradycardia at 56


Trops neg x3 


CXR no acute disease


ECHO: prelim negative 


Cardio Dr. Sanford consulted, help appreciated 


Lipid panel:  Chol 209  HDL 37

## 2018-11-08 NOTE — CP.PCM.CON
History of Present Illness





- History of Present Illness


History of Present Illness: 


Suraj Bergeron, PGY-1 Consult Note for Dr. Sanford, Cardiology





CC: Chest Pressure, Bradycardia





Ms. Terrell is a pleasant 52 F with PMHx of impaired glucose tolerance, 

hypercholesterolemia on Zocor and bilateral foot surgery for hammer toes who 

presents with a 4 day history of L sided substernal chest pressure. Patient 

reports 7/10 dull pain at its worst, currently down to a 4/10 with residual 

pressure sensations with radiation into posterior neck. Patient describes pain 

as exacerbated with exertion. Patient states she feels associated unsteadiness 

and headaches off and on for a while, but has never persisted like it has prior 

to this admission. Patient reports that on a hospitalization before her foot 

surgery, she was told that her heart rate is often low and should be evaluated. 

Patient reports a prior exercise stress test and echocardiogram performed 4-5 

years ago at this institution, which she reports as normal. No records have been

found of such tests in the EMR. Family history is significant for an MI in her 

mother at 68 and HTN in her father. Denies tobacco use. 








Past Patient History





- Infectious Disease


Hx of Infectious Diseases: None





- Past Medical History & Family History


Past Medical History?: Yes





- Past Social History


Smoking Status: Never Smoked





- PULMONARY


Hx Bronchitis: Yes (4 YEARS AGO)





- ENDOCRINE/METABOLIC


Other/Comment: "pre diabetic"





- MUSCULOSKELETAL/RHEUMATOLOGICAL


Hx Musculoskeletal Disorders: Yes (BUNION/HAMMERTOES RIGHT FOOT)





- GASTROINTESTINAL


Hx Gastrointestinal Disorders: Yes


Hx Gastroesophageal Reflux: Yes





- PSYCHIATRIC


Hx Substance Use: No





- SURGICAL HISTORY


Hx Cholecystectomy: Yes


Hx Tonsillectomy: Yes





- ANESTHESIA


Hx Anesthesia: Yes


Hx Anesthesia Reactions: Yes (NAUSEA)


Hx Malignant Hyperthermia: No





Meds


Allergies/Adverse Reactions: 


                                    Allergies











Allergy/AdvReac Type Severity Reaction Status Date / Time


 


Penicillins Allergy Intermediate RASH Verified 03/20/18 11:24














- Medications


Medications: 


                               Current Medications





Acetaminophen (Tylenol 325mg Tab)  650 mg PO Q6 PRN


   PRN Reason: Headache


Artificial Tears (Refresh Opth Soln)  0.1 ml OU DAILY [facility]


Aspirin (Aspirin Chewable)  81 mg PO DAILY [facility]


Heparin Sodium (Porcine) (Heparin)  5,000 units SC Q8 [facility]


   Last Admin: 11/08/18 05:37 Dose:  5,000 units


Rosuvastatin Calcium (Crestor)  2.5 mg PO HS [facility]


   Last Admin: 11/07/18 21:17 Dose:  2.5 mg











Physical Exam





- Constitutional


Appears: Well, Non-toxic, No Acute Distress





- Head Exam


Head Exam: ATRAUMATIC, NORMOCEPHALIC





- Eye Exam


Eye Exam: EOMI, Normal appearance


Pupil Exam: PERRL





- Neck Exam


Neck exam: Positive for: Full Rom, Tenderness (TTP posterior neck)





- Respiratory Exam


Respiratory Exam: Chest Wall Tenderness (TTP at sites of reported pressure), 

Clear to Auscultation Bilateral, NORMAL BREATHING PATTERN.  absent: Accessory 

Muscle Use, Decreased Breath Sounds, Rales, Rhonchi, Wheezes





- Cardiovascular Exam


Cardiovascular Exam: Bradycardia, REGULAR RHYTHM, +S1, +S2





- GI/Abdominal Exam


GI & Abdominal Exam: Soft.  absent: Distended, Guarding, Tenderness





- Neurological Exam


Neurological exam: Alert, Oriented x3





- Skin


Skin Exam: Dry, Intact, Normal Color, Warm





Results





- Vital Signs


Recent Vital Signs: 


                                Last Vital Signs











Temp  99.0 F   11/08/18 08:23


 


Pulse  57 L  11/08/18 08:23


 


Resp  20   11/08/18 08:23


 


BP  103/67   11/08/18 08:23


 


Pulse Ox  96   11/08/18 08:23














- Labs


Result Diagrams: 


                                 11/08/18 07:39





                                 11/08/18 07:39


Labs: 


                         Laboratory Results - last 24 hr











  11/07/18 11/07/18 11/07/18





  18:23 18:23 18:23


 


WBC  10.2  


 


RBC  4.91  


 


Hgb  13.8  


 


Hct  40.7  


 


MCV  83.0  


 


MCH  28.2  


 


MCHC  34.0  


 


RDW  13.6  


 


Plt Count  409 H  


 


MPV  8.0  


 


Neut % (Auto)  49.7 L  


 


Lymph % (Auto)  40.8 H  


 


Mono % (Auto)  5.8  


 


Eos % (Auto)  3.1  


 


Baso % (Auto)  0.6  


 


Neut # (Auto)  5.1  


 


Lymph # (Auto)  4.2  


 


Mono # (Auto)  0.6  


 


Eos # (Auto)  0.3  


 


Baso # (Auto)  0.1  


 


PT   11.4 


 


INR   1.0 


 


APTT   34 


 


Sodium    141


 


Potassium    3.7


 


Chloride    105


 


Carbon Dioxide    27


 


Anion Gap    13


 


BUN    10


 


Creatinine    0.8


 


Est GFR ( Amer)    > 60


 


Est GFR (Non-Af Amer)    > 60


 


Random Glucose    98


 


Hemoglobin A1c   


 


Calcium    9.8


 


Phosphorus   


 


Magnesium   


 


Total Bilirubin    0.6


 


AST    26


 


ALT    22


 


Alkaline Phosphatase    103


 


Total Creatine Kinase   


 


CK-MB (Mass)   


 


Troponin I    < 0.0120


 


NT-Pro-B Natriuret Pep    123


 


Total Protein    8.2


 


Albumin    4.5


 


Globulin    3.7


 


Albumin/Globulin Ratio    1.2


 


Triglycerides   


 


Cholesterol   


 


HDL Cholesterol   


 


Urine Color   


 


Urine Clarity   


 


Urine pH   


 


Ur Specific Gravity   


 


Urine Protein   


 


Urine Glucose (UA)   


 


Urine Ketones   


 


Urine Blood   


 


Urine Nitrate   


 


Urine Bilirubin   


 


Urine Urobilinogen   


 


Ur Leukocyte Esterase   


 


Urine WBC (Auto)   


 


Urine Bacteria   


 


Urine HCG, Qual   














  11/07/18 11/08/18 11/08/18





  18:43 01:41 07:39


 


WBC    7.4


 


RBC    4.48


 


Hgb    12.9


 


Hct    37.1


 


MCV    82.9


 


MCH    28.7


 


MCHC    34.6


 


RDW    13.6


 


Plt Count    378


 


MPV    8.4


 


Neut % (Auto)    43.1 L


 


Lymph % (Auto)    46.2 H


 


Mono % (Auto)    5.9


 


Eos % (Auto)    4.2 H


 


Baso % (Auto)    0.6


 


Neut # (Auto)    3.2


 


Lymph # (Auto)    3.4


 


Mono # (Auto)    0.4


 


Eos # (Auto)    0.3


 


Baso # (Auto)    0.0


 


PT   


 


INR   


 


APTT   


 


Sodium   


 


Potassium   


 


Chloride   


 


Carbon Dioxide   


 


Anion Gap   


 


BUN   


 


Creatinine   


 


Est GFR ( Amer)   


 


Est GFR (Non-Af Amer)   


 


Random Glucose   


 


Hemoglobin A1c   


 


Calcium   


 


Phosphorus   


 


Magnesium   


 


Total Bilirubin   


 


AST   


 


ALT   


 


Alkaline Phosphatase   


 


Total Creatine Kinase   < 20 L 


 


CK-MB (Mass)   < 0.22 


 


Troponin I   < 0.0120 


 


NT-Pro-B Natriuret Pep   


 


Total Protein   


 


Albumin   


 


Globulin   


 


Albumin/Globulin Ratio   


 


Triglycerides   


 


Cholesterol   


 


HDL Cholesterol   


 


Urine Color  Straw  


 


Urine Clarity  Clear  


 


Urine pH  6.0  


 


Ur Specific Gravity  1.005  


 


Urine Protein  Negative  


 


Urine Glucose (UA)  Normal  


 


Urine Ketones  Negative  


 


Urine Blood  Negative  


 


Urine Nitrate  Negative  


 


Urine Bilirubin  Negative  


 


Urine Urobilinogen  Normal  


 


Ur Leukocyte Esterase  Trace  


 


Urine WBC (Auto)  2  


 


Urine Bacteria  Rare  


 


Urine HCG, Qual  Negative  














  11/08/18 11/08/18 11/08/18





  07:39 07:39 07:39


 


WBC   


 


RBC   


 


Hgb   


 


Hct   


 


MCV   


 


MCH   


 


MCHC   


 


RDW   


 


Plt Count   


 


MPV   


 


Neut % (Auto)   


 


Lymph % (Auto)   


 


Mono % (Auto)   


 


Eos % (Auto)   


 


Baso % (Auto)   


 


Neut # (Auto)   


 


Lymph # (Auto)   


 


Mono # (Auto)   


 


Eos # (Auto)   


 


Baso # (Auto)   


 


PT   


 


INR   


 


APTT   


 


Sodium  140  


 


Potassium  4.1  


 


Chloride  107  


 


Carbon Dioxide  26  


 


Anion Gap  11  


 


BUN  10  


 


Creatinine  0.8  


 


Est GFR ( Amer)  > 60  


 


Est GFR (Non-Af Amer)  > 60  


 


Random Glucose  97  


 


Hemoglobin A1c   5.8 


 


Calcium  9.5  


 


Phosphorus  3.9  


 


Magnesium  2.3  


 


Total Bilirubin  0.9  


 


AST  26  


 


ALT  21  


 


Alkaline Phosphatase  114  


 


Total Creatine Kinase    < 20 L


 


CK-MB (Mass)    < 0.22


 


Troponin I    < 0.0120


 


NT-Pro-B Natriuret Pep   


 


Total Protein  7.2  


 


Albumin  4.0  


 


Globulin  3.1  


 


Albumin/Globulin Ratio  1.3  


 


Triglycerides  209 H  


 


Cholesterol  209 H  


 


HDL Cholesterol  37  


 


Urine Color   


 


Urine Clarity   


 


Urine pH   


 


Ur Specific Gravity   


 


Urine Protein   


 


Urine Glucose (UA)   


 


Urine Ketones   


 


Urine Blood   


 


Urine Nitrate   


 


Urine Bilirubin   


 


Urine Urobilinogen   


 


Ur Leukocyte Esterase   


 


Urine WBC (Auto)   


 


Urine Bacteria   


 


Urine HCG, Qual   














Assessment & Plan





- Assessment and Plan (Free Text)


Assessment: 


Ms. Terrell is a pleasant 52 F with HLD and glucose intolerance who presents 

with pleuritic chest pressure that is exacerbated by palpation. Currently on 

observation.





Chest pain rule out ACS, Costochondritis


- EKG: Sinus bradycardia, currently at 61 bpm on tele monitor


- TTP on upper chest


- proBNP 123


- Troponin neg x3


- Aspirin 81mg daily


- Echo this morning was normal on prelim read, no obvious deformities. F/u final

read


- Heparin SC


- F/u ESR, CRP, viral titers for possible viral etiology of chostochondritis


- Zithromax x 5 days for atypical viruses and its anti-inflammatory properties


- OK for discharge on Zithromax, plan to follow up in [place] office





Hypercholesterolemia


- Improved from 4/18


- , total 209


- Crestor 2.5mg PO HS





Patient seen, case reviewed with Dr. Sanford. Further recommendations per Dr. Sanford.





Suraj Bergeron, PGY-1

## 2018-11-11 ENCOUNTER — HOSPITAL ENCOUNTER (EMERGENCY)
Dept: HOSPITAL 31 - C.ER | Age: 53
Discharge: HOME | End: 2018-11-11
Payer: COMMERCIAL

## 2018-11-11 VITALS
DIASTOLIC BLOOD PRESSURE: 88 MMHG | HEART RATE: 84 BPM | TEMPERATURE: 98 F | RESPIRATION RATE: 14 BRPM | SYSTOLIC BLOOD PRESSURE: 145 MMHG

## 2018-11-11 VITALS — OXYGEN SATURATION: 99 %

## 2018-11-11 DIAGNOSIS — S42.002A: Primary | ICD-10-CM
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
## 2018-11-11 PROCEDURE — 96372 THER/PROPH/DIAG INJ SC/IM: CPT

## 2018-11-11 PROCEDURE — 73060 X-RAY EXAM OF HUMERUS: CPT

## 2018-11-11 PROCEDURE — 73030 X-RAY EXAM OF SHOULDER: CPT

## 2018-11-11 PROCEDURE — 99284 EMERGENCY DEPT VISIT MOD MDM: CPT

## 2018-11-11 NOTE — C.PDOC
- HPI


Chief Complaint (Nursing): Trauma





Past Medical History


Vital Signs: 





                                Last Vital Signs











Temp  97.9 F   11/11/18 18:58


 


Pulse  60   11/11/18 18:58


 


Resp  18   11/11/18 18:58


 


BP  164/86 H  11/11/18 18:58


 


Pulse Ox  99   11/11/18 18:58














- Medical History


PMH: Bronchitis (4 YEARS AGO)


Surgical History: Cholecystectomy, Endoscopy, Tonsillectomy


Family History: States: Unknown Family Hx





- Social History


Hx Tobacco Use: No


Hx Alcohol Use: No


Hx Substance Use: No





- Immunization History


Hx Tetanus Toxoid Vaccination: No (unsure)


Hx Influenza Vaccination: No


Hx Pneumococcal Vaccination: Yes





ED Course And Treatment


O2 Sat by Pulse Oximetry: 99





Disposition





- Disposition

## 2018-11-11 NOTE — C.PDOC
History Of Present Illness


52 year old female presents to the ED complaining of left shoulder pain status 

post fall in the tub approximately 6 hours ago. Denies any LOC or any other 

injuries. Contrary to triage, no deformity noted to left shoulder. Denies any 

weakness, numbness, or tingling. 





- HPI


Chief Complaint (Nursing): Trauma


History Per: Patient


History/Exam Limitations: no limitations


Onset/Duration Of Symptoms: Hrs


Injury Occurred (Timing): Hours Ago: (6)


Location Of Injury: Left: Shoulder





- Fall


Fall:Prior To Injury: Slipped





Past Medical History


Reviewed: Historical Data, Nursing Documentation, Vital Signs


Vital Signs: 





                                Last Vital Signs











Temp  97.9 F   11/11/18 18:58


 


Pulse  60   11/11/18 18:58


 


Resp  18   11/11/18 18:58


 


BP  164/86 H  11/11/18 18:58


 


Pulse Ox  99   11/11/18 18:58














- Medical History


PMH: Bronchitis (4 YEARS AGO)


Surgical History: Cholecystectomy, Endoscopy, Tonsillectomy


Family History: States: No Known Family Hx





- Social History


Hx Tobacco Use: No


Hx Alcohol Use: No


Hx Substance Use: No





- Immunization History


Hx Tetanus Toxoid Vaccination: No (unsure)


Hx Influenza Vaccination: No


Hx Pneumococcal Vaccination: Yes





Review Of Systems


Except As Marked, All Systems Reviewed And Found Negative.


Musculoskeletal: Positive for: Shoulder Pain (left)


Neurological: Negative for: Weakness, Numbness





Physical Exam





- Physical Exam


Appears: Non-toxic, No Acute Distress


Skin: Warm, Dry, No Rash


Head: Atraumatic, Normacephalic, No Laceration


Eye(s): bilateral: Normal Inspection


Neck: Normal ROM, Supple


Chest: Symmetrical


Cardiovascular: Rhythm Regular


Respiratory: Normal Breath Sounds, No Rales, No Rhonchi, No Wheezing


Extremity: No Normal ROM (limited due to pain to left shoulder ), Tenderness 

(tenderness to left humerus and upper shoulder area), No Deformity, No Swelling,

No Other (erythema, flattening of shoulder )


Extremity: Left: Limited ROM To Joint, Bilateral: Normal Color And Temperature


Neurological/Psych: Oriented x3, Normal Speech, Normal Cognition, Normal Motor, 

Normal Sensation, Normal Reflexes


Gait: Steady





ED Course And Treatment


O2 Sat by Pulse Oximetry: 99 (RA)


Pulse Ox Interpretation: Normal





- Other Rad


  ** left shoulder


X-Ray: Interpreted by Me


Interpretation: fracture lateral left clavicle.





Medical Decision Making


Medical Decision Making: 








Plan


- Toradol 30mg IM


- XR Left humerus 


- XR left shoulder





Disposition





- Disposition


Referrals: 


[facility] [Outside]


Lenny Vences III, MD [Staff Provider] - 


Disposition: HOME/ ROUTINE


Disposition Time: 18:50


Condition: STABLE


Prescriptions: 


traMADol/Acetaminophen [Ultracet 325 MG-37.5 MG] 1 tab PO Q4 #14 tab


Instructions:  Clavicle Fracture (DC), How to Use a Shoulder Sling


Forms:  Gen Discharge Inst Salvadorean, [facility] (English)


Print Language: British





- POA


Present On Arrival: None





- Clinical Impression


Clinical Impression: 


 Clavicular fracture








- Scribe Statement


The provider has reviewed the documentation as recorded by the Scribe


Tiff Blackwell





All medical record entries made by the Idalmisibe were at my direction and 

personally dictated by me. I have reviewed the chart and agree that the record 

accurately reflects my personal performance of the history, physical exam, 

medical decision making, and the department course for this patient. I have also

personally directed, reviewed, and agree with the discharge instructions and 

disposition.

## 2018-11-12 NOTE — RAD
PROCEDURE:  Radiographs of the left humerus.



HISTORY:

injury



COMPARISON:

None.



FINDINGS:



BONES:

Bone alignment and mineralization are normal.  There is no acute 

displaced fracture or bone destruction.



SOFT TISSUES:

Normal.



OTHER FINDINGS:

None.



IMPRESSION:

No acute fracture or dislocation.

## 2018-11-12 NOTE — RAD
Date of service: 



11/11/2018



PROCEDURE:  < injury >



HISTORY:

injury







COMPARISON:

No prior.



FINDINGS:



BONES:

Bone alignment and mineralization are normal.  There is no acute 

displaced fracture or bone destruction.



JOINTS:

The glenohumeral and acromioclavicular joints are preserved.  No 

significant degenerative osteoarthrosis. 



SOFT TISSUES:

Normal.



OTHER FINDINGS:

None. 



IMPRESSION:

No acute displaced fracture or dislocation.